# Patient Record
Sex: FEMALE | Race: ASIAN | Employment: FULL TIME | URBAN - METROPOLITAN AREA
[De-identification: names, ages, dates, MRNs, and addresses within clinical notes are randomized per-mention and may not be internally consistent; named-entity substitution may affect disease eponyms.]

---

## 2019-01-17 PROBLEM — R42 DIZZINESS: Status: ACTIVE | Noted: 2019-01-17

## 2022-03-18 PROBLEM — R42 DIZZINESS: Status: ACTIVE | Noted: 2019-01-17

## 2022-09-13 ENCOUNTER — OFFICE VISIT (OUTPATIENT)
Dept: CARDIOLOGY CLINIC | Age: 42
End: 2022-09-13
Payer: COMMERCIAL

## 2022-09-13 VITALS
HEART RATE: 88 BPM | HEIGHT: 61 IN | BODY MASS INDEX: 30.96 KG/M2 | SYSTOLIC BLOOD PRESSURE: 120 MMHG | WEIGHT: 164 LBS | DIASTOLIC BLOOD PRESSURE: 88 MMHG

## 2022-09-13 DIAGNOSIS — E78.00 PURE HYPERCHOLESTEROLEMIA: ICD-10-CM

## 2022-09-13 DIAGNOSIS — I10 HTN (HYPERTENSION), BENIGN: ICD-10-CM

## 2022-09-13 DIAGNOSIS — I35.1 NONRHEUMATIC AORTIC VALVE INSUFFICIENCY: ICD-10-CM

## 2022-09-13 DIAGNOSIS — Q23.1 BICUSPID AORTIC VALVE: Primary | ICD-10-CM

## 2022-09-13 PROCEDURE — 99214 OFFICE O/P EST MOD 30 MIN: CPT | Performed by: INTERNAL MEDICINE

## 2022-09-13 NOTE — PROGRESS NOTES
[address], [address] [employer] [address]  PHONE: [phone]     22    NAME:  Shanelle Gaines  : 1980  MRN: [id number]       SUBJECTIVE:   Shanelle Gaines is a 40 y.o. female seen for a follow up visit regarding the following:     Chief Complaint   Patient presents with    Hypertension       HPI:    Here for bicuspid AV and AS/AI    ( CAITY: The valve was functionally bicuspid. There was moderate stenosis. There was moderate regurgitation). Echo 2019 and 2019 and 2020 and 2022:    normal EF, mod/severe AS/AI, Ao 4cm     Traveled to [facility], walked everywhere, doing well now. No new angina, CP, ALCANTARA, pressure. Feeling well overall. Elvia Number at lunch now. Exercising more now. Feeling well. Past Medical History, Past Surgical History, Family history, Social History, and Medications were all reviewed with the patient today and updated as necessary. Current Outpatient Medications   Medication Sig Dispense Refill    atorvastatin (LIPITOR) 20 MG tablet Take 20 mg by mouth daily      dapagliflozin (FARXIGA) 5 MG tablet Take 5 mg by mouth daily      Dulaglutide (TRULICITY) 1.5 OU/8.5UZ SOPN Inject 4.5 mg into the skin      labetalol (NORMODYNE) 100 MG tablet Take 100 mg by mouth 2 times daily      LORazepam (ATIVAN) 0.5 MG tablet Take 0.5 mg by mouth daily as needed. metFORMIN (GLUCOPHAGE-XR) 500 MG extended release tablet Take 1,000 mg by mouth Daily with supper      predniSONE (DELTASONE) 10 MG tablet Take 4 tabs x 3 days then 3 tabs x 3 days then 2 tabs x 3 days then 1 tab x 3 days then stop.      sulfamethoxazole-trimethoprim (BACTRIM DS;SEPTRA DS) 800-160 MG per tablet Take by mouth 2 times daily       No current facility-administered medications for this visit.         No Known Allergies  Patient Active Problem List    Diagnosis Date Noted    Dizziness 2019    HTN (hypertension), benign 10/12/2015    Aortic valve insufficiency 10/12/2015 Bicuspid aortic valve 10/12/2015    Pure hypercholesterolemia 05/13/2015    DM (diabetes mellitus), type 2, uncontrolled ([facility] [address] 05/13/2015      Past Surgical History:   Procedure Laterality Date    PELVIC LAPAROSCOPY      removal of endometriosis     Family History   Problem Relation Age of Onset    No Known Problems Mother     No Known Problems Father      Social History     Tobacco Use    Smoking status: Never    Smokeless tobacco: Never   Substance Use Topics    Alcohol use: No         ROS:    No obvious pertinent positives on review of systems except for what was outlined in the HPI today.       PHYSICAL EXAM:     /88   Pulse 88   Ht 5' 1\" (1.549 m)   Wt 164 lb (74.4 kg)   BMI 30.99 kg/m²    General/Constitutional:   Alert and oriented x 3, no acute distress  HEENT:   normocephalic, atraumatic, moist mucous membranes  Neck:   No JVD or carotid bruits bilaterally  Cardiovascular:   regular rate and rhythm, 2/6 SM  Pulmonary:   clear to auscultation bilaterally, no respiratory distress  Abdomen:   soft, non-tender, non-distended  Ext:   No sig LE edema bilaterally  Skin:  warm and dry, no obvious rashes seen  Neuro:   no obvious sensory or motor deficits  Psychiatric:   normal mood and affect      Lab Results   Component Value Date/Time     06/18/2020 09:59 AM    K 4.4 06/18/2020 09:59 AM    CL 97 06/18/2020 09:59 AM    CO2 21 06/18/2020 09:59 AM    BUN 14 06/18/2020 09:59 AM    CREATININE 0.60 06/18/2020 09:59 AM    GLUCOSE 194 06/18/2020 09:59 AM    CALCIUM 9.8 06/18/2020 09:59 AM        Lab Results   Component Value Date    WBC 10.0 06/18/2020    HGB 15.2 06/18/2020    HCT 44.5 06/18/2020    MCV 89 06/18/2020     06/18/2020       Lab Results   Component Value Date    TSH 1.950 06/18/2020       Lab Results   Component Value Date    LABA1C 8.5 (H) 06/18/2020     Lab Results   Component Value Date     06/18/2020       Lab Results   Component Value Date    CHOL 191 06/18/2020    CHOL 127 10/15/2019    CHOL 148 05/23/2019     Lab Results   Component Value Date    TRIG 181 (H) 06/18/2020    TRIG 80 10/15/2019    TRIG 111 05/23/2019     Lab Results   Component Value Date    HDL 46 06/18/2020    HDL 40 10/15/2019    HDL 40 05/23/2019     Lab Results   Component Value Date    LDLCALC 109 (H) 06/18/2020    LDLCALC 71 10/15/2019    LDLCALC 86 05/23/2019     Lab Results   Component Value Date    LABVLDL 36 06/18/2020    LABVLDL 16 10/15/2019    LABVLDL 22 05/23/2019     No results found for: CHOLHDLRATIO        I have Independently reviewed prior care notes, any ER records available, cardiac testing, labs and results with the patient and before seeing the patient today. Also independently reviewed outside records when available. ASSESSMENT:    Kylah was seen today for hypertension. Diagnoses and all orders for this visit:    Bicuspid aortic valve    HTN (hypertension), benign    Nonrheumatic aortic valve insufficiency    Pure hypercholesterolemia        PLAN:      No plans to get pregnant, follow for now. 1. AS/AI:  Call for cardinal sx of AS and AI as reviewed. Echo in 12-24 mos or change in sx. She will call PRN. Follow closely. ESD and EF normal.    AVR and TAVR reviewed. Follow. labs every 4 mos. Follow. 2. Ao root:  Echo in 12- 24 mos, hold on CTA for now. 3. HTN: remain on BB. Goal SBP < 130 as reviewed. 4. HPL: remain on lipitor. Labs soon. .    Patient was encouraged to engage in regular moderate physical activity for at least 30-45 minutes at least 4-5 days of the week. We also reviewed heart healthy diets such as a whole foods plant based diet and a mediterranean diet rich in whole grains, fruits and vegetables. The importance of a healthy lifestyle was reviewed as well. The patient voiced understanding.          Patient has been instructed and agrees to call our office with any issues or other concerns related to their cardiac condition(s)

## 2023-11-07 ENCOUNTER — OFFICE VISIT (OUTPATIENT)
Age: 43
End: 2023-11-07
Payer: COMMERCIAL

## 2023-11-07 VITALS
HEART RATE: 83 BPM | DIASTOLIC BLOOD PRESSURE: 76 MMHG | BODY MASS INDEX: 30.21 KG/M2 | WEIGHT: 160 LBS | SYSTOLIC BLOOD PRESSURE: 122 MMHG | HEIGHT: 61 IN

## 2023-11-07 DIAGNOSIS — I35.1 NONRHEUMATIC AORTIC VALVE INSUFFICIENCY: ICD-10-CM

## 2023-11-07 DIAGNOSIS — I10 HTN (HYPERTENSION), BENIGN: Primary | ICD-10-CM

## 2023-11-07 DIAGNOSIS — Q23.1 BICUSPID AORTIC VALVE: ICD-10-CM

## 2023-11-07 DIAGNOSIS — E78.00 PURE HYPERCHOLESTEROLEMIA: ICD-10-CM

## 2023-11-07 PROCEDURE — 3078F DIAST BP <80 MM HG: CPT | Performed by: INTERNAL MEDICINE

## 2023-11-07 PROCEDURE — 99214 OFFICE O/P EST MOD 30 MIN: CPT | Performed by: INTERNAL MEDICINE

## 2023-11-07 PROCEDURE — 93000 ELECTROCARDIOGRAM COMPLETE: CPT | Performed by: INTERNAL MEDICINE

## 2023-11-07 PROCEDURE — 3074F SYST BP LT 130 MM HG: CPT | Performed by: INTERNAL MEDICINE

## 2023-11-07 RX ORDER — DAPAGLIFLOZIN 10 MG/1
TABLET, FILM COATED ORAL
COMMUNITY
Start: 2023-10-11

## 2023-11-07 RX ORDER — TIRZEPATIDE 5 MG/.5ML
INJECTION, SOLUTION SUBCUTANEOUS
COMMUNITY
Start: 2023-10-20

## 2023-11-07 NOTE — PROGRESS NOTES
[address] [facility], [facility], [place]  PHONE: [phone]     23    NAME:  Colby Raphael  : 1980  MRN: [id number]       SUBJECTIVE:   Colby Raphael is a 44 y.o. female seen for a follow up visit regarding the following:     Chief Complaint   Patient presents with    Hypertension       HPI: Here for bicuspid AV and AS/AI    ( CAITY: The valve was functionally bicuspid. There was moderate stenosis. There was moderate regurgitation). Echo 2019 and 2019 and 2020 and 2022:    normal EF, mod/severe AS/AI, Ao 4cm     Feeling well now, active. No new angina, CP, ALCANTARA, pressure. Feeling well overall. Exercising more now. Feeling well. Patient denies recent history of orthopnea, PND, excessive dizziness and/or syncope. Past Medical History, Past Surgical History, Family history, Social History, and Medications were all reviewed with the patient today and updated as necessary. Current Outpatient Medications   Medication Sig Dispense Refill    MOUNJARO 5 MG/0.5ML SOPN SC injection       FARXIGA 10 MG tablet       atorvastatin (LIPITOR) 20 MG tablet Take 1 tablet by mouth daily      labetalol (NORMODYNE) 100 MG tablet Take 1 tablet by mouth 2 times daily      LORazepam (ATIVAN) 0.5 MG tablet Take 1 tablet by mouth daily as needed. metFORMIN (GLUCOPHAGE-XR) 500 MG extended release tablet Take 2 tablets by mouth Daily with supper      sulfamethoxazole-trimethoprim (BACTRIM DS;SEPTRA DS) 800-160 MG per tablet Take by mouth 2 times daily       No current facility-administered medications for this visit.         No Known Allergies  Patient Active Problem List    Diagnosis Date Noted    Dizziness 2019    HTN (hypertension), benign 10/12/2015    Aortic valve insufficiency 10/12/2015    Bicuspid aortic valve 10/12/2015    Pure hypercholesterolemia 2015    DM (diabetes mellitus), type 2, uncontrolled 2015      Past Surgical History:

## 2024-05-14 ENCOUNTER — OFFICE VISIT (OUTPATIENT)
Age: 44
End: 2024-05-14
Payer: COMMERCIAL

## 2024-05-14 VITALS
DIASTOLIC BLOOD PRESSURE: 90 MMHG | HEART RATE: 100 BPM | BODY MASS INDEX: 29.64 KG/M2 | HEIGHT: 61 IN | SYSTOLIC BLOOD PRESSURE: 118 MMHG | WEIGHT: 157 LBS

## 2024-05-14 DIAGNOSIS — I35.1 NONRHEUMATIC AORTIC VALVE INSUFFICIENCY: ICD-10-CM

## 2024-05-14 DIAGNOSIS — Q23.1 BICUSPID AORTIC VALVE: Primary | ICD-10-CM

## 2024-05-14 DIAGNOSIS — I10 HTN (HYPERTENSION), BENIGN: ICD-10-CM

## 2024-05-14 DIAGNOSIS — E78.00 PURE HYPERCHOLESTEROLEMIA: ICD-10-CM

## 2024-05-14 PROCEDURE — 3074F SYST BP LT 130 MM HG: CPT | Performed by: INTERNAL MEDICINE

## 2024-05-14 PROCEDURE — 3080F DIAST BP >= 90 MM HG: CPT | Performed by: INTERNAL MEDICINE

## 2024-05-14 PROCEDURE — 99214 OFFICE O/P EST MOD 30 MIN: CPT | Performed by: INTERNAL MEDICINE

## 2024-05-14 NOTE — PROGRESS NOTES
[address] [facility], [place] [address]  PHONE: [phone]     24    NAME:  Joann Varela  : 1980  MRN: [id number]       SUBJECTIVE:   Joann Varela is a 44 y.o. female seen for a follow up visit regarding the following:     Chief Complaint   Patient presents with    Hypertension     Echo        HPI:   Here for bicuspid AV and AS/AI    ( CAITY: The valve was functionally bicuspid. There was moderate stenosis. There was moderate regurgitation).     Echo 2019 and 2019 and 2020 and 2022 and 2024:   normal EF, mod/severe AS/AI, Ao 4cm     Feeling well now, active.    No new angina, CP, ALCANTARA, pressure.  Feeling well overall.   Exercising more now.  Feeling well.     Patient denies recent history of orthopnea, PND, excessive dizziness and/or syncope.               Past Medical History, Past Surgical History, Family history, Social History, and Medications were all reviewed with the patient today and updated as necessary.     Current Outpatient Medications   Medication Sig Dispense Refill    MOUNJARO 5 MG/0.5ML SOPN SC injection       FARXIGA 10 MG tablet       atorvastatin (LIPITOR) 20 MG tablet Take 1 tablet by mouth daily      labetalol (NORMODYNE) 100 MG tablet Take 1 tablet by mouth 2 times daily      LORazepam (ATIVAN) 0.5 MG tablet Take 1 tablet by mouth daily as needed.      metFORMIN (GLUCOPHAGE-XR) 500 MG extended release tablet Take 2 tablets by mouth Daily with supper      sulfamethoxazole-trimethoprim (BACTRIM DS;SEPTRA DS) 800-160 MG per tablet Take by mouth 2 times daily       No current facility-administered medications for this visit.        No Known Allergies  Patient Active Problem List    Diagnosis Date Noted    Dizziness 2019    HTN (hypertension), benign 10/12/2015    Aortic valve insufficiency 10/12/2015    Bicuspid aortic valve 10/12/2015    Pure hypercholesterolemia 2015    DM (diabetes mellitus), type 2, uncontrolled 2015

## 2024-11-18 ENCOUNTER — OFFICE VISIT (OUTPATIENT)
Age: 44
End: 2024-11-18
Payer: COMMERCIAL

## 2024-11-18 VITALS
SYSTOLIC BLOOD PRESSURE: 114 MMHG | HEART RATE: 91 BPM | WEIGHT: 160 LBS | DIASTOLIC BLOOD PRESSURE: 86 MMHG | HEIGHT: 61 IN | BODY MASS INDEX: 30.21 KG/M2

## 2024-11-18 DIAGNOSIS — I35.1 NONRHEUMATIC AORTIC VALVE INSUFFICIENCY: ICD-10-CM

## 2024-11-18 DIAGNOSIS — I10 HTN (HYPERTENSION), BENIGN: ICD-10-CM

## 2024-11-18 DIAGNOSIS — E78.00 PURE HYPERCHOLESTEROLEMIA: ICD-10-CM

## 2024-11-18 DIAGNOSIS — Q23.81 BICUSPID AORTIC VALVE: Primary | ICD-10-CM

## 2024-11-18 PROCEDURE — 3079F DIAST BP 80-89 MM HG: CPT | Performed by: INTERNAL MEDICINE

## 2024-11-18 PROCEDURE — 93000 ELECTROCARDIOGRAM COMPLETE: CPT | Performed by: INTERNAL MEDICINE

## 2024-11-18 PROCEDURE — 99214 OFFICE O/P EST MOD 30 MIN: CPT | Performed by: INTERNAL MEDICINE

## 2024-11-18 PROCEDURE — 3074F SYST BP LT 130 MM HG: CPT | Performed by: INTERNAL MEDICINE

## 2024-11-18 NOTE — PROGRESS NOTES
sx of AS and AI as reviewed.    Echo 12mos.    Follow closely.    ESD and EF normal.     SAVR and TAVR reviewed.       Patient was encouraged to engage in regular moderate physical activity for at least 30-45 minutes at least 4-5 days of the week.  We also reviewed heart healthy diets such as a whole foods plant based diet and a mediterranean diet rich in whole grains, fruits and vegetables.  The importance of a healthy lifestyle was reviewed as well.  The patient voiced understanding.          2. Ao root:  Echo in 12 mos, hold on CTA for now.         3. HTN: remain on BB.  Goal SBP < 130 as reviewed.       4. HPL: remain on lipitor.        Patient has been instructed and agrees to call our office with any issues or other concerns related to their cardiac condition(s) and/or complaint(s).        Return in about 6 months (around 5/18/2025).       Tyler Calderon, DO  11/18/2024

## 2025-06-03 ENCOUNTER — OFFICE VISIT (OUTPATIENT)
Age: 45
End: 2025-06-03
Payer: COMMERCIAL

## 2025-06-03 VITALS
BODY MASS INDEX: 30.38 KG/M2 | DIASTOLIC BLOOD PRESSURE: 86 MMHG | SYSTOLIC BLOOD PRESSURE: 128 MMHG | HEART RATE: 82 BPM | WEIGHT: 160.9 LBS | HEIGHT: 61 IN

## 2025-06-03 DIAGNOSIS — Q23.81 BICUSPID AORTIC VALVE: Primary | ICD-10-CM

## 2025-06-03 DIAGNOSIS — R06.02 SHORTNESS OF BREATH: ICD-10-CM

## 2025-06-03 DIAGNOSIS — E78.00 PURE HYPERCHOLESTEROLEMIA: ICD-10-CM

## 2025-06-03 DIAGNOSIS — I10 HTN (HYPERTENSION), BENIGN: ICD-10-CM

## 2025-06-03 DIAGNOSIS — I35.1 NONRHEUMATIC AORTIC VALVE INSUFFICIENCY: ICD-10-CM

## 2025-06-03 PROCEDURE — 3074F SYST BP LT 130 MM HG: CPT | Performed by: INTERNAL MEDICINE

## 2025-06-03 PROCEDURE — 3079F DIAST BP 80-89 MM HG: CPT | Performed by: INTERNAL MEDICINE

## 2025-06-03 PROCEDURE — 99214 OFFICE O/P EST MOD 30 MIN: CPT | Performed by: INTERNAL MEDICINE

## 2025-06-03 NOTE — PROGRESS NOTES
[address] [facility], [place] [address]  PHONE: [phone]     25    NAME:  Joann Varela  : 1980  MRN: [id number]       SUBJECTIVE:   Joann Varela is a 45 y.o. female seen for a follow up visit regarding the following:     Chief Complaint   Patient presents with    Bicuspid aortic valve       HPI:   Here for bicuspid AV and AS/AI    ( CAITY: The valve was functionally bicuspid. There was moderate stenosis. There was moderate regurgitation).     Echo 2019 and 2019 and 2020 and 2022 and 2024:   normal EF, mod/severe AS/AI, Ao 4cm     Traveled in [place] and did well.   No new angina, CP, ALCANTARA, pressure.  Feeling well overall.     Exercising more now.  Feeling well.     Patient denies recent history of orthopnea, PND, excessive dizziness and/or syncope.         Past Medical History, Past Surgical History, Family history, Social History, and Medications were all reviewed with the patient today and updated as necessary.     Current Outpatient Medications   Medication Sig Dispense Refill    Multiple Vitamin (MULTIVITAMIN ADULT PO) Take by mouth      MOUNJARO 5 MG/0.5ML SOPN SC injection       FARXIGA 10 MG tablet       atorvastatin (LIPITOR) 20 MG tablet Take 1 tablet by mouth daily      labetalol (NORMODYNE) 100 MG tablet Take 1 tablet by mouth 2 times daily      LORazepam (ATIVAN) 0.5 MG tablet Take 1 tablet by mouth daily as needed.      metFORMIN (GLUCOPHAGE-XR) 500 MG extended release tablet Take 2 tablets by mouth Daily with supper      sulfamethoxazole-trimethoprim (BACTRIM DS;SEPTRA DS) 800-160 MG per tablet Take by mouth 2 times daily (Patient not taking: Reported on 6/3/2025)       No current facility-administered medications for this visit.        No Known Allergies  Patient Active Problem List    Diagnosis Date Noted    Dizziness 2019    HTN (hypertension), benign 10/12/2015    Aortic valve insufficiency 10/12/2015    Bicuspid aortic valve 10/12/2015